# Patient Record
(demographics unavailable — no encounter records)

---

## 2024-10-10 NOTE — HISTORY OF PRESENT ILLNESS
[FreeTextEntry1] : 58F w incidentally diagnosed Hashimoto thyroiditis during her first preganncy (not requiring replacement) who was recently found to have a lung mass in for preop CV evaluation - functional capacity > 4 METS, no exertional symptoms  FH: thyroid disorder - M, sister, cousin; no early CAD, heart disease SH: smoked in her teenage years (social), no other substances  (around glazing shefali a lot, inhalation of particles? usually without a mask) vegan, was vegetarian

## 2024-10-30 NOTE — PROCEDURE
[de-identified] : Procedure performed: Fiberoptic Laryngeal Endoscopy - Diagnostic for neck swelling/lymphadenopathy After informed verbal consent topical neosynephrine and lidocaine were applied, the fiberoptic nasal endoscope was passed via the R/L nasal cavity without incident. The patient tolerated the procedure quite well. The nasal cavity was normal without evidence of masses, polyps, lesions or drainage. The nasal septum and turbinates are within normal limits. Nasopharynx was normal without lesions or masses. Eustachian tube orifice normal bilaterals. Oropharynx and Hypopharyngeal mucosa are within normal limits without lesions masses, ulcerations or concerning findings. True and false vocal folds are within normal limits, normal sensory and motor examination. The base of tongue, vallecula, epiglottis are within normal limits without evidence of lesions or abnormalities.  Positive Findings: No evidence of mass lesion, post adenoidectomy, tonsillectomy

## 2024-10-30 NOTE — PHYSICAL EXAM
[de-identified] : Bilateral neck exam wnl, she does have a thin neck and her submandibular glands are visible and palpable. No pathologic adenopathy on exam. [de-identified] : Visible submandibular glands. [Midline] : trachea located in midline position [Normal] : no rashes

## 2024-10-30 NOTE — HISTORY OF PRESENT ILLNESS
[de-identified] : 58 year old female concerned with potential for swollen visible lymph nodes in her neck. She also reports a history of Hashimotos and thyroid nodules. The area she is indicating is posterior level II, submandibular, and states she is bothered by the swellings as they are visible. No pain, fever, chills, nausea, vomiting, or weight loss reported.

## 2024-10-30 NOTE — PHYSICAL EXAM
[de-identified] : Bilateral neck exam wnl, she does have a thin neck and her submandibular glands are visible and palpable. No pathologic adenopathy on exam. [de-identified] : Visible submandibular glands. [Midline] : trachea located in midline position [Normal] : no rashes

## 2024-10-30 NOTE — HISTORY OF PRESENT ILLNESS
[de-identified] : 58 year old female concerned with potential for swollen visible lymph nodes in her neck. She also reports a history of Hashimotos and thyroid nodules. The area she is indicating is posterior level II, submandibular, and states she is bothered by the swellings as they are visible. No pain, fever, chills, nausea, vomiting, or weight loss reported.

## 2024-10-30 NOTE — PROCEDURE
[de-identified] : Procedure performed: Fiberoptic Laryngeal Endoscopy - Diagnostic for neck swelling/lymphadenopathy After informed verbal consent topical neosynephrine and lidocaine were applied, the fiberoptic nasal endoscope was passed via the R/L nasal cavity without incident. The patient tolerated the procedure quite well. The nasal cavity was normal without evidence of masses, polyps, lesions or drainage. The nasal septum and turbinates are within normal limits. Nasopharynx was normal without lesions or masses. Eustachian tube orifice normal bilaterals. Oropharynx and Hypopharyngeal mucosa are within normal limits without lesions masses, ulcerations or concerning findings. True and false vocal folds are within normal limits, normal sensory and motor examination. The base of tongue, vallecula, epiglottis are within normal limits without evidence of lesions or abnormalities.  Positive Findings: No evidence of mass lesion, post adenoidectomy, tonsillectomy

## 2024-11-13 NOTE — PHYSICAL EXAM
[Midline] : trachea located in midline position [Normal] : no neck adenopathy [de-identified] : submandibular glands ptotic bilaterally. Parotid and thyroid glands are normal.

## 2024-11-13 NOTE — HISTORY OF PRESENT ILLNESS
[de-identified] : Ms. HERRERA is a 58 year old woman who was referred by RANJANA NARAYANAN MD for submandibular swelling  Pt reports she noticed 'swelling under her jaw' 2 years ago. She always believed this was a lymph node and was not concerned. However, over the past 2 years she's noticed that the swelling has increased. Area is not painful. Swelling is constant and does not fluctuate. There is no swelling when eating or drinking There is no weight loss, night sweats, fever or chills.  She is bothered by her appearance due to this swelling.  She has hx of Hashimoto's thyroiditis she does not follow with endocrinology or take medication. She's had thyroid nodules biopsied in the past which were benign.  She saw Dr. Narayanan 2 weeks ago who ordered a CT neck. CT revealed no abnormal cervical lymph nodes. There were also thyroid nodules that met criteria for FNA but pt states she had these same nodules biopsied in 03/2024 and nodules were benign.  She was referred to me for further evaluation

## 2024-11-13 NOTE — ASSESSMENT
[FreeTextEntry1] :  Ms. HERRERA was evaluated for the following today:   Bilateral submandibular swelling x 2 years, increasing in size. Area is not painful. Swelling is constant and does not fluctuate. There is no swelling when eating or drinking -Will order MR neck for detailed evaluation  -Once complete, will call wJosep bernal

## 2024-11-15 NOTE — ASSESSMENT
[FreeTextEntry1] : Data reviewed: pathology 6/13/24 - solitary fibrous tumor, no evidence of necrosis or marked pleomorphism   57 y/o F with h/o thyroid nodules and benign lymph nodes incidentally found to have RUL pleural based mass with cyst/bullae. No other past medical history, not symptomatic. CT showed 55mm RUL pleural based mass with associated cyst. 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 Remains asymptomatic, feeling well. Plan for surveillance CT in 6 months.   #Emphysema   Mild emphysema noted on pathology, pt asymptomatic, no intervention necessary  Can RTC in 6 months, or sooner PRN.

## 2024-11-15 NOTE — PROCEDURE
[FreeTextEntry1] : Pathology 10/16/24 1  Right mediastinal mass with (right) upper lobe wedge Final Diagnosis 1.  Lung, right upper lobe wedge with mass, excision: -   Solitary fibrous tumor; low risk for distant recurrence (see note) -   8.5 cm greatest dimension -   Margins negative for tumor -   Surrounding lung with mild emphysematous change and focus of subpleural fibrosis  6/13/24 pathology Specimen(s) Submitted 1  Right upper lobe lung care bx and touch prep Final Diagnosis Right upper lobe lung, core biopsy: -   Cellular spindle cell neoplasm, favor Solitary Fibrous Tumor (see note). -   No necrosis or marked pleomorphism visualized.  4/22/24 CT CHEST 1. There is a 55 mm right apical lung mass, suspicious for lung cancer. Biopsy recommended. 2. Multinodular goiter.  CT neck 1/17/24  Pleural based RUL mass with cystic like changes, multiple thyroid lesions, no LAD in neck

## 2024-11-15 NOTE — HISTORY OF PRESENT ILLNESS
[Never] : never [TextBox_4] : 58 yo F w/ hx of thyroid nodule, sees endocrinologist and no thyroid disease, s/p radiofrequency ablation to shrink nodules (benign), was sent by PCP for ? abnormal chest imaging? Pt says the thyroid US showed something abnormal in her R lung. At initial visit, was able to review CT neck which demonstrated possible pleural  based lesion, now following up after dedicated chest CT to review imaging.  No respiratory complaints, no dyspnea/cough/chest pain/discomfort. Appropriate exercise tolerance, no limitations. Familial history of breast cancer in mother, no lung disease/other cancer in family. Denies constitutional symptoms such as fevers, weight loss, changes in appetite, lethargy. No known asbestos exposure, never lived in a basement.   No hx of smoking, no fam hx of lung disease, had tonsillitis s/p tonsillectomy, mumps as a child. Born in [place] ([facility]), moved here in [place], only lived in [place] since then. Works as , no known exposure to chemical/inhalants/mold/asbestos. Rarely travels, last time she left the country was 2012, only travels in [place]. Takes no medications other than supplements.   CT chest confirmed pleural based mass, initially after discussion with IP mass was felt to be amenable to bronchoscopic biopsy; however, after further review given likely pleural origin decision made to pursue CT guided. Now presents to follow up pathology.  11/15/24 Here for follow up ater resection. Doing well. Remains asymptomatic. Following with CTS, planning for surveillance CT in 6 months.

## 2025-03-19 NOTE — HISTORY OF PRESENT ILLNESS
[FreeTextEntry1] : Ms. Kaufman is a 59 y/o female, never smoker, w/ PMHx of thyroid nodule (s/p radiofrequency ablation). She was referred by Dr. Nancy Causey for an incidental finding of a pleural based lesion found on CT neck in January 2024.  The mass was biopsied on 6/13/24 with findings that favor a solitary fibrous tumor. On 10/16/24 patient underwent an uncomplicated R VATS RA apical lung mass resection/small RUL wedge. Surgical pathology + solitary fibrous tumor with low risk for distant recurrence and negative margins.  She presents today for a follow-up visit to review 6-month surveillance CT.  CT Chest 4/11/25: XX

## 2025-04-17 NOTE — ASSESSMENT
[FreeTextEntry1] : 57 y/o female, never smoker, w/ PMHx of thyroid nodule (s/p radiofrequency ablation). She was referred by Dr. Nancy Causey for an incidental finding of a pleural based lesion found on CT neck in January 2024.  The mass was biopsied on 6/13/24 with findings that favor a solitary fibrous tumor. On 10/16/24 patient underwent an uncomplicated R VATS RA apical lung mass resection/small RUL wedge. Surgical pathology + solitary fibrous tumor with low risk for distant recurrence and negative margins.  She presents today for a follow-up visit to review 6-month surveillance CT.  CT Chest 4/11/25: 1. Interval removal of a right apical solitary fibrous tumor with expected postsurgical change. No evidence of local disease recurrence with expected postsurgical change 2. No developing suspicious parenchymal nodules or masses.  She feels well and has no complaints. Her chest CT was reviewed including images and shows no recurrence of her tumor. We will continue to follow her with a chest CT for surveillance in 1 year.  Plan: Chest CT 1 year  I, Dr. Iker Wellington MD, personally performed the evaluation and management (E/M) services for this established patient who presents today with (a) new problem(s)/exacerbation of (an) existing condition(s).  That E/M includes conducting the clinically appropriate interval history &/or exam, assessing all new/exacerbated conditions, and establishing a new plan of care. I have also independently reviewed the medical records and imaging at the time of this office visit, and discussed the above mentioned images with interpretations with the patient. Today, my SUSHILA was here to observe &/or participate in the visit & follow plan of care established by me.

## 2025-04-17 NOTE — PHYSICAL EXAM
[Respiration, Rhythm And Depth] : normal respiratory rhythm and effort [Auscultation Breath Sounds / Voice Sounds] : lungs were clear to auscultation bilaterally [Heart Rate And Rhythm] : heart rate was normal and rhythm regular [Heart Sounds] : normal S1 and S2 [Examination Of The Chest] : the chest was normal in appearance [Chest Visual Inspection Thoracic Asymmetry] : no chest asymmetry [Abnormal Walk] : normal gait [Nail Clubbing] : no clubbing  or cyanosis of the fingernails [Skin Color & Pigmentation] : normal skin color and pigmentation [] : no rash [Sensation] : the sensory exam was normal to light touch and pinprick [Motor Exam] : the motor exam was normal [Oriented To Time, Place, And Person] : oriented to person, place, and time [Affect] : the affect was normal [Mood] : the mood was normal

## 2025-04-22 NOTE — PROCEDURE
[FreeTextEntry1] : CT 4/11/25 IMPRESSION:  1. Interval removal of a right apical solitary fibrous tumor with expected postsurgical change. No evidence of local disease recurrence with expected postsurgical change 2. No developing suspicious parenchymal nodules or masses.  Pathology 10/16/24 1  Right mediastinal mass with (right) upper lobe wedge Final Diagnosis 1.  Lung, right upper lobe wedge with mass, excision: -   Solitary fibrous tumor; low risk for distant recurrence (see note) -   8.5 cm greatest dimension -   Margins negative for tumor -   Surrounding lung with mild emphysematous change and focus of subpleural fibrosis  6/13/24 pathology Specimen(s) Submitted 1  Right upper lobe lung care bx and touch prep Final Diagnosis Right upper lobe lung, core biopsy: -   Cellular spindle cell neoplasm, favor Solitary Fibrous Tumor (see note). -   No necrosis or marked pleomorphism visualized.  4/22/24 CT CHEST 1. There is a 55 mm right apical lung mass, suspicious for lung cancer. Biopsy recommended. 2. Multinodular goiter.  CT neck 1/17/24  Pleural based RUL mass with cystic like changes, multiple thyroid lesions, no LAD in neck

## 2025-04-22 NOTE — HISTORY OF PRESENT ILLNESS
[Never] : never [TextBox_4] : 58 yo F w/ hx of thyroid nodule, sees endocrinologist and no thyroid disease, s/p radiofrequency ablation to shrink nodules (benign), was sent by PCP for ? abnormal chest imaging? Pt says the thyroid US showed something abnormal in her R lung. At initial visit, was able to review CT neck which demonstrated possible pleural  based lesion, now following up after dedicated chest CT to review imaging. No respiratory complaints, no dyspnea/cough/chest pain/discomfort. Appropriate exercise tolerance, no limitations. Familial history of breast cancer in mother, no lung disease/other cancer in family. Denies constitutional symptoms such as fevers, weight loss, changes in appetite, lethargy. No known asbestos exposure, never lived in a basement.  No hx of smoking, no fam hx of lung disease, had tonsillitis s/p tonsillectomy, mumps as a child. Born in [place], ([facility]), moved here in [place], only lived in [place] since then. Works as , no known exposure to chemical/inhalants/mold/asbestos. Rarely travels, last time she left the country was 2012, only travels in [place]. Takes no medications other than supplements.   Found to have solitary fibrous tumor s/p resection w/ CT surgery 10/2024, presents after surveillance CT post op  4/22/25 - Made follow up to discuss repeat CT chest - remains asymptomatic, exercising. Wanted to review PET from last year as GYN did labs and found transaminitis, was told to ask if PET was abnormal. She has no symptoms. Advised PCP follow up, reassured PET without liver lesion.

## 2025-07-30 NOTE — PHYSICAL EXAM
[Alert] : alert [Well Nourished] : well nourished [No Acute Distress] : no acute distress [Normal Sclera/Conjunctiva] : normal sclera/conjunctiva [EOMI] : extra ocular movement intact [PERRL] : pupils equal, round and reactive to light [Normal Outer Ear/Nose] : the ears and nose were normal in appearance [Normal Hearing] : hearing was normal [Normal TMs] : both tympanic membranes were normal [No Neck Mass] : no neck mass was observed [Thyroid Not Enlarged] : the thyroid was not enlarged [No Respiratory Distress] : no respiratory distress [Clear to Auscultation] : lungs were clear to auscultation bilaterally [Normal S1, S2] : normal S1 and S2 [Normal Rate] : heart rate was normal [Regular Rhythm] : with a regular rhythm [Normal Bowel Sounds] : normal bowel sounds [Not Tender] : non-tender [Soft] : abdomen soft [Normal Gait] : normal gait [No Clubbing, Cyanosis] : no clubbing  or cyanosis of the fingernails [No Joint Swelling] : no joint swelling seen [No Rash] : no rash [No Skin Lesions] : no skin lesions [Oriented x3] : oriented to person, place, and time [Normal Insight/Judgement] : insight and judgment were intact

## 2025-07-30 NOTE — HISTORY OF PRESENT ILLNESS
[FreeTextEntry1] : 58 year old female here for evaluation of Hashimoto's disease and thyroid nodules.  Previously was    She reported that she had enlarged submandibular glands and then she was also found to have thyroid nodules.  Patient reported that she has history of thyroid nodules.  She reported that she had an FNA of the left sided nodule which was benign by Dr. Germain at [place] and after that she had an RFA.  She denied any compressive neck symptoms No FH of thyroid cancer   Symptoms:  - No abnormal BM  - No hair loss  -No weight changes  -Occasional HP  -Normal energy  -Some cold intolerance  	     ACC: [id number]     EXAM:  US HEAD NECK SOFT TISSUE   ORDERED BY: GARRISON CARDONA  PROCEDURE DATE:  10/31/2024    INTERPRETATION:  CLINICAL INFORMATION: Enlarged lymph nodes.  COMPARISON: PET/CT from 8/26/2024.  TECHNIQUE:  Ultrasound of the thyroid and neck was performed.  FINDINGS: RIGHT LOBE: Dimensions: 5.3 x 2.6 x 2.7 cm Echotexture: Homogeneous Vascularity: Normovascular The right lobe contains two visible nodules.  Nodule 1: Location: Midpole Dimensions: 1.1 x 0.7 x 0.9 cm (sagittal x AP x transverse) Composition: Solid For solid nodules or for the solid portion of a partially cystic nodule, does the solid portion have any of the following suspicious features? -  No: Hypoechoic -  No: Microcalcifications -  No: Irregular margin (infiltrative or microlobulated) -  No: Taller than wide (AP dimension > transverse) -  No: Extrathyroidal extension -  No: Interrupted rim calcification with soft tissue extrusion  Nodule 2: Location: Midpole Dimensions: 2.1 x 1.3 x 2.1 cm (sagittal x AP x transverse) Composition: Predominantly solid with cystic areas For solid nodules or for the solid portion of a partially cystic nodule, does the solid portion have any of the following suspicious features? -  No: Hypoechoic -  No: Microcalcifications -  No: Irregular margin (infiltrative or microlobulated) -  No: Taller than wide (AP dimension > transverse) -  No: Extrathyroidal extension -  No: Interrupted rim calcification with soft tissue extrusion  LEFT LOBE: Dimensions: 5.6 x 2.0 x 2.0 cm Echotexture: Homogeneous Vascularity: Normovascular The left lobe contains two visible nodules.  Nodule 1: Location: Midpole Dimensions: 2.8 x 1.3 x 1.9 cm (sagittal x AP x transverse) Composition: Solid with coarse calcification For solid nodules or for the solid portion of a partially cystic nodule, does the solid portion have any of the following suspicious features? -  No: Hypoechoic -  No: Microcalcifications -  No: Irregular margin (infiltrative or microlobulated) -  No: Taller than wide (AP dimension > transverse) -  No: Extrathyroidal extension -  No: Interrupted rim calcification with soft tissue extrusion  Nodule 2: Location: Mid/lower pole Dimensions: 1.8 x 1.3 x 1.6 cm (sagittal x AP x transverse) Composition: Solid For solid nodules or for the solid portion of a partially cystic nodule, does the solid portion have any of the following suspicious features? -  No: Hypoechoic -  No: Microcalcifications -  No: Irregular margin (infiltrative or microlobulated) -  No: Taller than wide (AP dimension > transverse) -  No: Extrathyroidal extension -  No: Interrupted rim calcification with soft tissue extrusion  ISTHMUS: Dimensions: 0.5 cm The isthmus lobe contains no visible nodules.  CERVICAL LYMPH NODE EVALUATION: -  No abnormal lymph nodes are identified in the neck.  PARATHYROID EXAMINATION: -  Parathyroid glands not visualized.   IMPRESSION: 1.  Multinodular goiter. One right midpole (Right "Nodule #2") and two left (Left "Nodule #1" and "Nodule #2") thyroid nodules meet sonographic criteria for FNA if not already done. 2.  No abnormal cervical lymph node identified.

## 2025-07-30 NOTE — REVIEW OF SYSTEMS
[Fatigue] : no fatigue [Decreased Appetite] : appetite not decreased [Visual Field Defect] : no visual field defect [Dysphagia] : no dysphagia [Dysphonia] : no dysphonia [Chest Pain] : no chest pain [Palpitations] : no palpitations [Shortness Of Breath] : no shortness of breath [Cough] : no cough [Nausea] : no nausea [Constipation] : no constipation [Vomiting] : no vomiting [Diarrhea] : no diarrhea [Polyuria] : no polyuria [Irregular Menses] : regular menses [Joint Pain] : no joint pain [Acanthosis] : no acanthosis  [Acne] : no acne [Headaches] : no headaches [Dizziness] : no dizziness [Pain/Numbness of Digits] : no pain/numbness of digits [Depression] : no depression [Polydipsia] : no polydipsia [Easy Bleeding] : no ~M tendency for easy bleeding [Easy Bruising] : no tendency for easy bruising

## 2025-07-30 NOTE — ASSESSMENT
[FreeTextEntry1] : 58-year-old female here for evaluation of Hashimoto's disease   Hashimoto's disease:  -Check TFTs  -Clinically euthyroid   Thyroid Nodules:  -History of left sided thyroid nodule RFA in 2021 ( By Dr. Germain)  -Nodules are stable -No compressive symptoms  -Next ult 06/2026, will be performed at [address]   -Follow up US in one year